# Patient Record
(demographics unavailable — no encounter records)

---

## 2024-12-17 NOTE — HISTORY OF PRESENT ILLNESS
[FreeTextEntry1] : 58 year old M with HTN, DM who presents for cardiac evaluation of chest discomfort and SOB.  Meds: losartan 50, glipizide 5  Pt reports intermittent chest heaviness and SOB x 1-2 weeks. It occurs randomly and not necessarily related to exertion. It is located in the L upper chest and sometimes radiates down his L arm. He states the SOB is usually with exertion, walking fast, or climbing stairs. 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 No palpitation, dizziness, LOC, orthopnea, PND, or LE edema.  Last seen by Dr. Schwartz 6/1/18 - 52 year old male was found to have ECG changes in doctor's office before the planned colonoscopic examination. In the meanwhile, he states that he has been experiencing exertional dyspnea while uphill walking, and on and off palpitation although without chest pain/discomfort or dizziness.

## 2024-12-17 NOTE — ASSESSMENT
[FreeTextEntry1] : 58 year old M with HTN, DM who presents for cardiac evaluation of chest discomfort and SOB.  Meds: losartan 50, glipizide 5  Pt reports intermittent chest heaviness and SOB x 1-2 weeks. It occurs randomly and not necessarily related to exertion. It is located in the L upper chest and sometimes radiates down his L arm. He states the SOB is usually with exertion, walking fast, or climbing stairs. 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 No palpitation, dizziness, LOC, orthopnea, PND, or LE edema.  1) Chest discomfort, at rest and with exertion 2) SOB on exertion 3) HTN - EKG 12/17/24 showed sinus rhythm without ischemic changes - I advised pt to undergo treadmill stress test; pt did 1:55 min Austin protocol, had to stop due to chest tightness and did not achieve target HR. There were no ischemic ECG changes at 77% MPHR. - Pt underwent TTE 12/17/24 which showed normal LVEF 60%, normal RV size/function and mild TR - Given chest discomfort during stress test and inability to reach target HR, I advised pt to undergo CCTA   4) Follow-up, pending CCTA

## 2025-01-21 NOTE — HISTORY OF PRESENT ILLNESS
[FreeTextEntry1] :  12/17/24 - Pt reports intermittent chest heaviness and SOB x 1-2 weeks. It occurs randomly and not necessarily related to exertion. It is located in the L upper chest and sometimes radiates down his L arm. He states the SOB is usually with exertion, walking fast, or climbing stairs. 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 No palpitation, dizziness, LOC, orthopnea, PND, or LE edema.  Last seen by Dr. Schwartz 6/1/18 - 52 year old male was found to have ECG changes in doctor's office before the planned colonoscopic examination. In the meanwhile, he states that he has been experiencing exertional dyspnea while uphill walking, and on and off palpitation although without chest pain/discomfort or dizziness.

## 2025-01-21 NOTE — ASSESSMENT
[FreeTextEntry1] : 1) Chest discomfort, at rest and with exertion 2) SOB on exertion 3) HTN - EKG 12/17/24 showed sinus rhythm without ischemic changes - I advised pt to undergo treadmill stress test; pt did 1:55 min Austin protocol, had to stop due to chest tightness and did not achieve target HR. There were no ischemic ECG changes at 77% MPHR. - Pt underwent TTE 12/17/24 which showed normal LVEF 60%, normal RV size/function and mild TR - Given chest discomfort during stress test and inability to reach target HR, I advised pt to undergo CCTA  4) Follow-up,

## 2025-01-21 NOTE — REASON FOR VISIT
[Symptom and Test Evaluation] : symptom and test evaluation [FreeTextEntry1] : 58 year old M with HTN, DM who presents for f/u.  Meds: losartan 50, glipizide 5  CCTA 1/17/25 showed Ca score 194, normal LM, mild p/m LAD stenosis, normal LCx, normal RCA. Incidentally saw hepatic stenosis and aortic root calcifications.

## 2025-01-23 NOTE — HISTORY OF PRESENT ILLNESS
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 He is here to review CCTA result.  12/17/24 - Pt reports intermittent chest heaviness and SOB x 1-2 weeks. It occurs randomly and not necessarily related to exertion. It is located in the L upper chest and sometimes radiates down his L arm. He states the SOB is usually with exertion, walking fast, or climbing stairs. 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 No palpitation, dizziness, LOC, orthopnea, PND, or LE edema.  Last seen by Dr. Schwartz 6/1/18 - 52 year old male was found to have ECG changes in doctor's office before the planned colonoscopic examination. In the meanwhile, he states that he has been experiencing exertional dyspnea while uphill walking, and on and off palpitation although without chest pain/discomfort or dizziness.

## 2025-01-23 NOTE — REASON FOR VISIT
[Coronary Artery Disease] : coronary artery disease [FreeTextEntry3] : Dr. Jennings [FreeTextEntry1] : 58 year old M with HTN, DM who presents for f/u.  Meds: losartan 50, glipizide 5  CCTA 1/17/25 showed Ca score 194, normal LM, mild p/m LAD stenosis, normal LCx, normal RCA. Incidentally saw hepatic stenosis and aortic root calcifications.

## 2025-01-23 NOTE — ASSESSMENT
[FreeTextEntry1] : 58 year old M with HTN, DM who presents for f/u.  1) Chest discomfort, at rest and with exertion 2) SOB on exertion - Pt underwent treadmill stress test 12/17/24; pt did 1:55 min Austin protocol, had to stop due to chest tightness and did not achieve target HR. There were no ischemic ECG changes at 77% MPHR. - Pt underwent TTE 12/17/24 which showed normal LVEF 60%, normal RV size/function and mild TR - Given chest discomfort during stress test and inability to reach target HR, I advised pt to undergo CCTA 1/17/25 which showed Ca score 194, normal LM, mild p/m LAD stenosis, normal LCx, normal RCA. Incidentally saw hepatic stenosis and aortic root calcifications.  - Based on CCTA result, his chest discomfort is unlikely epicardial CAD related. I advised pt to start Atorvastatin 20mg daily for medical management of CAD. - Continue good BP and A1c control with Dr. Jennings  3) HTN - Continue Losartan 50mg daily  4) Follow-up, 6 months or sooner if symptomatic